# Patient Record
(demographics unavailable — no encounter records)

---

## 2024-12-12 NOTE — HEALTH RISK ASSESSMENT
[0] : 2) Feeling down, depressed, or hopeless: Not at all (0) [PHQ-2 Negative - No further assessment needed] : PHQ-2 Negative - No further assessment needed [Never] : Never [Patient reported colonoscopy was normal] : Patient reported colonoscopy was normal [[id number]] : 0 [ColonoscopyDate] : 09/23 [ColonoscopyComments] : Dr. Ly [HepatitisCDate] : 08/18

## 2024-12-12 NOTE — ASSESSMENT
[FreeTextEntry1] : nonobstructive CAD - refill crestor 10mg. Check labs. f/u cardio hx vit D deficiency - cont supplementation, has been wnl recent years so pt deferred recheck this year Healthy diet and regular exercise regimen discussed w/ pt. Screening labs ordered Risks and benefits of flu vaccine discussed w/ pt. Consent given by pt, flu vaccine administered. Pt tolerated well Any questions call office

## 2024-12-12 NOTE — HISTORY OF PRESENT ILLNESS
[de-identified] : Pt in office for CPE. Pt has been feeling well.  Pt was found to have atherosclerosis of LAD on CT angio done 10/5/21. Pt now on crestor 10mg, tolerating well. Pt also taking [id number]. Pt sees cardio Dr. Lentz annually Denies CP, palpitations, dyspnea, n/v. Denies LUTS.   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